# Patient Record
Sex: FEMALE | Race: BLACK OR AFRICAN AMERICAN | Employment: OTHER | URBAN - METROPOLITAN AREA
[De-identification: names, ages, dates, MRNs, and addresses within clinical notes are randomized per-mention and may not be internally consistent; named-entity substitution may affect disease eponyms.]

---

## 2017-04-03 ENCOUNTER — HOSPITAL ENCOUNTER (OUTPATIENT)
Dept: CT IMAGING | Age: 67
Discharge: HOME | End: 2017-04-03
Attending: INTERNAL MEDICINE
Payer: MEDICARE

## 2017-04-03 ENCOUNTER — HOSPITAL ENCOUNTER (OUTPATIENT)
Dept: MAMMOGRAPHY | Age: 67
Discharge: HOME | End: 2017-04-03
Attending: INTERNAL MEDICINE
Payer: MEDICARE

## 2017-04-03 DIAGNOSIS — Z78.0 POSTMENOPAUSAL: ICD-10-CM

## 2017-04-03 DIAGNOSIS — R91.1 SOLITARY PULMONARY NODULE ON LUNG CT: ICD-10-CM

## 2017-04-03 PROCEDURE — 77080 DXA BONE DENSITY AXIAL: CPT

## 2017-04-03 PROCEDURE — 71250 CT THORAX DX C-: CPT

## 2017-04-04 NOTE — PROGRESS NOTES
CT Chest report reviewed and showed one small lung nodule that is likely benign. Will repeat scan in 1 year to make sure it does not grow.   Please inform the patient

## 2017-04-06 NOTE — PROGRESS NOTES
DEXA scan bone density xray report reviewed and shows osteopenia which is mild weakness of bones but FRAX score is not bad enough to warrant any prescription medication.    Please inform the patient

## 2017-11-30 ENCOUNTER — APPOINTMENT (OUTPATIENT)
Dept: GENERAL RADIOLOGY | Age: 67
End: 2017-11-30
Attending: EMERGENCY MEDICINE
Payer: MEDICARE

## 2017-11-30 LAB
ALBUMIN SERPL-MCNC: 3.9 G/DL (ref 3.2–4.6)
ALBUMIN/GLOB SERPL: 1.1 {RATIO} (ref 1.2–3.5)
ALP SERPL-CCNC: 131 U/L (ref 50–136)
ALT SERPL-CCNC: 18 U/L (ref 12–65)
ANION GAP SERPL CALC-SCNC: 10 MMOL/L (ref 7–16)
AST SERPL-CCNC: 17 U/L (ref 15–37)
BASOPHILS # BLD: 0 K/UL (ref 0–0.2)
BASOPHILS NFR BLD: 0 % (ref 0–2)
BILIRUB SERPL-MCNC: 1.1 MG/DL (ref 0.2–1.1)
BUN SERPL-MCNC: 21 MG/DL (ref 8–23)
CALCIUM SERPL-MCNC: 9.8 MG/DL (ref 8.3–10.4)
CHLORIDE SERPL-SCNC: 105 MMOL/L (ref 98–107)
CO2 SERPL-SCNC: 29 MMOL/L (ref 21–32)
CREAT SERPL-MCNC: 1.04 MG/DL (ref 0.6–1)
DIFFERENTIAL METHOD BLD: ABNORMAL
EOSINOPHIL # BLD: 0.2 K/UL (ref 0–0.8)
EOSINOPHIL NFR BLD: 2 % (ref 0.5–7.8)
ERYTHROCYTE [DISTWIDTH] IN BLOOD BY AUTOMATED COUNT: 13.8 % (ref 11.9–14.6)
GLOBULIN SER CALC-MCNC: 3.5 G/DL (ref 2.3–3.5)
GLUCOSE SERPL-MCNC: 106 MG/DL (ref 65–100)
HCT VFR BLD AUTO: 41.6 % (ref 35.8–46.3)
HGB BLD-MCNC: 14.5 G/DL (ref 11.7–15.4)
IMM GRANULOCYTES # BLD: 0 K/UL (ref 0–0.5)
IMM GRANULOCYTES NFR BLD AUTO: 0 % (ref 0–5)
LYMPHOCYTES # BLD: 2.2 K/UL (ref 0.5–4.6)
LYMPHOCYTES NFR BLD: 23 % (ref 13–44)
MCH RBC QN AUTO: 30.7 PG (ref 26.1–32.9)
MCHC RBC AUTO-ENTMCNC: 34.9 G/DL (ref 31.4–35)
MCV RBC AUTO: 88.1 FL (ref 79.6–97.8)
MONOCYTES # BLD: 0.5 K/UL (ref 0.1–1.3)
MONOCYTES NFR BLD: 5 % (ref 4–12)
NEUTS SEG # BLD: 6.8 K/UL (ref 1.7–8.2)
NEUTS SEG NFR BLD: 70 % (ref 43–78)
PLATELET # BLD AUTO: 235 K/UL (ref 150–450)
PMV BLD AUTO: 10 FL (ref 10.8–14.1)
POTASSIUM SERPL-SCNC: 3.5 MMOL/L (ref 3.5–5.1)
PROT SERPL-MCNC: 7.4 G/DL (ref 6.3–8.2)
RBC # BLD AUTO: 4.72 M/UL (ref 4.05–5.25)
SODIUM SERPL-SCNC: 144 MMOL/L (ref 136–145)
TROPONIN I SERPL-MCNC: <0.02 NG/ML (ref 0.02–0.05)
WBC # BLD AUTO: 9.7 K/UL (ref 4.3–11.1)

## 2017-11-30 PROCEDURE — 85025 COMPLETE CBC W/AUTO DIFF WBC: CPT | Performed by: EMERGENCY MEDICINE

## 2017-11-30 PROCEDURE — 80053 COMPREHEN METABOLIC PANEL: CPT | Performed by: EMERGENCY MEDICINE

## 2017-11-30 PROCEDURE — 72040 X-RAY EXAM NECK SPINE 2-3 VW: CPT

## 2017-11-30 PROCEDURE — 96374 THER/PROPH/DIAG INJ IV PUSH: CPT | Performed by: EMERGENCY MEDICINE

## 2017-11-30 PROCEDURE — 84484 ASSAY OF TROPONIN QUANT: CPT | Performed by: EMERGENCY MEDICINE

## 2017-11-30 PROCEDURE — 99285 EMERGENCY DEPT VISIT HI MDM: CPT | Performed by: EMERGENCY MEDICINE

## 2017-11-30 PROCEDURE — 93005 ELECTROCARDIOGRAM TRACING: CPT | Performed by: EMERGENCY MEDICINE

## 2017-11-30 PROCEDURE — 96361 HYDRATE IV INFUSION ADD-ON: CPT | Performed by: EMERGENCY MEDICINE

## 2017-11-30 PROCEDURE — 96375 TX/PRO/DX INJ NEW DRUG ADDON: CPT | Performed by: EMERGENCY MEDICINE

## 2017-12-01 ENCOUNTER — HOSPITAL ENCOUNTER (EMERGENCY)
Age: 67
Discharge: HOME | End: 2017-12-01
Attending: EMERGENCY MEDICINE
Payer: MEDICARE

## 2017-12-01 VITALS
HEART RATE: 62 BPM | HEIGHT: 65 IN | RESPIRATION RATE: 18 BRPM | BODY MASS INDEX: 21.49 KG/M2 | TEMPERATURE: 98.1 F | WEIGHT: 129 LBS | DIASTOLIC BLOOD PRESSURE: 78 MMHG | OXYGEN SATURATION: 99 % | SYSTOLIC BLOOD PRESSURE: 162 MMHG

## 2017-12-01 DIAGNOSIS — R55 VASOVAGAL SYNCOPE: Primary | ICD-10-CM

## 2017-12-01 DIAGNOSIS — R11.2 NON-INTRACTABLE VOMITING WITH NAUSEA, UNSPECIFIED VOMITING TYPE: ICD-10-CM

## 2017-12-01 DIAGNOSIS — S16.1XXA STRAIN OF NECK MUSCLE, INITIAL ENCOUNTER: ICD-10-CM

## 2017-12-01 LAB
ATRIAL RATE: 78 BPM
CALCULATED P AXIS, ECG09: 50 DEGREES
CALCULATED R AXIS, ECG10: 50 DEGREES
CALCULATED T AXIS, ECG11: 58 DEGREES
DIAGNOSIS, 93000: NORMAL
P-R INTERVAL, ECG05: 160 MS
Q-T INTERVAL, ECG07: 374 MS
QRS DURATION, ECG06: 64 MS
QTC CALCULATION (BEZET), ECG08: 426 MS
VENTRICULAR RATE, ECG03: 78 BPM

## 2017-12-01 PROCEDURE — 96375 TX/PRO/DX INJ NEW DRUG ADDON: CPT | Performed by: EMERGENCY MEDICINE

## 2017-12-01 PROCEDURE — 74011250636 HC RX REV CODE- 250/636: Performed by: EMERGENCY MEDICINE

## 2017-12-01 PROCEDURE — 96374 THER/PROPH/DIAG INJ IV PUSH: CPT | Performed by: EMERGENCY MEDICINE

## 2017-12-01 PROCEDURE — 96361 HYDRATE IV INFUSION ADD-ON: CPT | Performed by: EMERGENCY MEDICINE

## 2017-12-01 RX ORDER — KETOROLAC TROMETHAMINE 30 MG/ML
15 INJECTION, SOLUTION INTRAMUSCULAR; INTRAVENOUS
Status: COMPLETED | OUTPATIENT
Start: 2017-12-01 | End: 2017-12-01

## 2017-12-01 RX ORDER — TRAMADOL HYDROCHLORIDE 50 MG/1
50 TABLET ORAL
Qty: 20 TAB | Refills: 0 | Status: SHIPPED | OUTPATIENT
Start: 2017-12-01 | End: 2018-09-24 | Stop reason: ALTCHOICE

## 2017-12-01 RX ORDER — ONDANSETRON 2 MG/ML
4 INJECTION INTRAMUSCULAR; INTRAVENOUS
Status: COMPLETED | OUTPATIENT
Start: 2017-12-01 | End: 2017-12-01

## 2017-12-01 RX ORDER — ONDANSETRON 8 MG/1
8 TABLET, ORALLY DISINTEGRATING ORAL
Qty: 12 TAB | Refills: 0 | Status: SHIPPED | OUTPATIENT
Start: 2017-12-01 | End: 2018-09-24 | Stop reason: ALTCHOICE

## 2017-12-01 RX ADMIN — SODIUM CHLORIDE 1000 ML: 900 INJECTION, SOLUTION INTRAVENOUS at 00:47

## 2017-12-01 RX ADMIN — KETOROLAC TROMETHAMINE 15 MG: 30 INJECTION, SOLUTION INTRAMUSCULAR at 01:47

## 2017-12-01 RX ADMIN — ONDANSETRON 4 MG: 2 INJECTION INTRAMUSCULAR; INTRAVENOUS at 00:47

## 2017-12-01 NOTE — ED PROVIDER NOTES
HPI Comments: Patient presents to the ER that having a syncopal episode. States today after eating dinner she began experiencing some nausea and vomiting. Reports nausea persisted. States she was leaning over to clean up some vomitus when she passed out. States  She remembers she woke up on the ground. Does not know how long she passed out for. Doesn't dorsum neck pain. Denies any accompanying chest pain or shortness of breath. Patient is a 79 y.o. female presenting with syncope. The history is provided by the patient. Syncope    This is a new problem. The current episode started 3 to 5 hours ago. The problem has not changed since onset. Length of episode of loss of consciousness: unknown. Associated symptoms include nausea and vomiting. Pertinent negatives include no palpitations, no confusion, no fever, no abdominal pain, no congestion, no back pain and no light-headedness. Associated symptoms comments: Nausea and vomiting.         Past Medical History:   Diagnosis Date    HTN (hypertension)     Solitary pulmonary nodule on lung CT        Past Surgical History:   Procedure Laterality Date    COLONOSCOPY N/A 6/16/2016    COLONOSCOPY performed by Israel Monte MD at [facility]    HX CHOLECYSTECTOMY      HX COLONOSCOPY  2005         Family History:   Problem Relation Age of Onset    Diabetes Daughter     Diabetes Brother     Diabetes Brother     Cancer Mother      lung    Cancer Sister      lung    Cancer Brother      lung       Social History     Social History    Marital status: LEGALLY      Spouse name: N/A    Number of children: N/A    Years of education: N/A     Occupational History     Retired     Social History Main Topics    Smoking status: Former Smoker     Quit date: 1/1/1970    Smokeless tobacco: Never Used    Alcohol use No    Drug use: No    Sexual activity: Not on file     Other Topics Concern    Not on file     Social History Narrative ALLERGIES: Review of patient's allergies indicates no known allergies. Review of Systems   Constitutional: Negative for fatigue, fever and unexpected weight change. HENT: Negative for congestion and dental problem. Eyes: Negative for photophobia, redness and visual disturbance. Respiratory: Negative for chest tightness, shortness of breath and stridor. Cardiovascular: Positive for syncope. Negative for palpitations and leg swelling. Gastrointestinal: Positive for nausea and vomiting. Negative for abdominal pain. Endocrine: Negative for polydipsia, polyphagia and polyuria. Genitourinary: Negative for decreased urine volume, flank pain and frequency. Musculoskeletal: Negative for back pain and gait problem. Skin: Negative for pallor and rash. Allergic/Immunologic: Negative for food allergies and immunocompromised state. Neurological: Negative for light-headedness and numbness. Hematological: Negative for adenopathy. Does not bruise/bleed easily. Psychiatric/Behavioral: Negative for behavioral problems and confusion. All other systems reviewed and are negative. Vitals:    11/30/17 2202   BP: 156/75   Pulse: 86   Resp: 18   Temp: 98.1 °F (36.7 °C)   SpO2: 97%   Weight: 58.5 kg (129 lb)   Height: 5' 5\" (1.651 m)            Physical Exam   Constitutional: She is oriented to person, place, and time. She appears well-developed and well-nourished. HENT:   Head: Normocephalic and atraumatic. Mouth/Throat: Oropharynx is clear and moist.   Eyes: Conjunctivae and EOM are normal. Pupils are equal, round, and reactive to light. Neck: Normal range of motion. Neck supple. Muscular tenderness present. No spinous process tenderness present. No rigidity. No tracheal deviation and normal range of motion present. No thyromegaly present. Cardiovascular: Normal rate, regular rhythm and intact distal pulses.     Pulmonary/Chest: Effort normal and breath sounds normal. No respiratory distress. She has no wheezes. Abdominal: Soft. Bowel sounds are normal. She exhibits no distension. There is no tenderness. Musculoskeletal: Normal range of motion. She exhibits no edema, tenderness or deformity. Neurological: She is alert and oriented to person, place, and time. She has normal reflexes. Nursing note and vitals reviewed. MDM  Number of Diagnoses or Management Options  Diagnosis management comments: Given proceeding nausea and vomiting, syncopal episode was likely vasovagal in nature  We'll obtain basic labs, EKG as well as cardiac enzymes.   Continue to monitor symptoms    12:42 AM  X-rays cervical spine read as negative       Amount and/or Complexity of Data Reviewed  Clinical lab tests: ordered and reviewed  Tests in the radiology section of CPT®: ordered and reviewed    Risk of Complications, Morbidity, and/or Mortality  Presenting problems: moderate  Diagnostic procedures: moderate  Management options: moderate    Patient Progress  Patient progress: stable    ED Course       Procedures

## 2017-12-01 NOTE — ED TRIAGE NOTES
Pt arrives via EMS with daughter. Pt states she vomited following evening meal. She went to laundry with clothes where she states she \"blacked out\". States she had another episode of vomiting after she \"woke up\". Pt denies nausea at this time. Complains of pain to neck. EMS reports confined space at location of fall.

## 2017-12-01 NOTE — DISCHARGE INSTRUCTIONS
Neck Strain: Care Instructions  Your Care Instructions    You have strained the muscles and ligaments in your neck. A sudden, awkward movement can strain the neck. This often occurs with falls or car accidents or during certain sports. Everyday activities like working on a computer or sleeping can also cause neck strain if they force you to hold your neck in an awkward position for a long time. It is common for neck pain to get worse for a day or two after an injury, but it should start to feel better after that. You may have more pain and stiffness for several days before it gets better. This is expected. It may take a few weeks or longer for it to heal completely. Good home treatment can help you get better faster and avoid future neck problems. Follow-up care is a key part of your treatment and safety. Be sure to make and go to all appointments, and call your doctor if you are having problems. It's also a good idea to know your test results and keep a list of the medicines you take. How can you care for yourself at home? · If you were given a neck brace (cervical collar) to limit neck motion, wear it as instructed for as many days as your doctor tells you to. Do not wear it longer than you were told to. Wearing a brace for too long can make neck stiffness worse and weaken the neck muscles. · You can try using heat or ice to see if it helps. ¨ Try using a heating pad on a low or medium setting for 15 to 20 minutes every 2 to 3 hours. Try a warm shower in place of one session with the heating pad. You can also buy single-use heat wraps that last up to 8 hours. ¨ You can also try an ice pack for 10 to 15 minutes every 2 to 3 hours. · Take pain medicines exactly as directed. ¨ If the doctor gave you a prescription medicine for pain, take it as prescribed. ¨ If you are not taking a prescription pain medicine, ask your doctor if you can take an over-the-counter medicine.   · Gently rub the area to relieve pain and help with blood flow. Do not massage the area if it hurts to do so. · Do not do anything that makes the pain worse. Take it easy for a couple of days. You can do your usual activities if they do not hurt your neck or put it at risk for more stress or injury. · Try sleeping on a special neck pillow. Place it under your neck, not under your head. Placing a tightly rolled-up towel under your neck while you sleep will also work. If you use a neck pillow or rolled towel, do not use your regular pillow at the same time. · To prevent future neck pain, do exercises to stretch and strengthen your neck and back. Learn how to use good posture, safe lifting techniques, and proper body mechanics. When should you call for help? Call 911 anytime you think you may need emergency care. For example, call if:  ? · You are unable to move an arm or a leg at all. ?Call your doctor now or seek immediate medical care if:  ? · You have new or worse symptoms in your arms, legs, chest, belly, or buttocks. Symptoms may include:  ¨ Numbness or tingling. ¨ Weakness. ¨ Pain. ? · You lose bladder or bowel control. ? Watch closely for changes in your health, and be sure to contact your doctor if:  ? · You are not getting better as expected. Where can you learn more? Go to http://nini-kaiden.info/. Enter M253 in the search box to learn more about \"Neck Strain: Care Instructions. \"  Current as of: March 21, 2017  Content Version: 11.4  © 5150-2456 [email or web address]. Care instructions adapted under license by [employer] (which disclaims liability or warranty for this information). If you have questions about a medical condition or this instruction, always ask your healthcare professional. [place] [address] any warranty or liability for your use of this information.          Fainting: Care Instructions  Your Care Instructions    When you faint, or pass out, you lose consciousness for a short time. A brief drop in blood flow to the brain often causes it. When you fall or lie down, more blood flows to your brain and you regain consciousness. Emotional stress, pain, or overheating-especially if you have been standing-can make you faint. In these cases, fainting is usually not serious. But fainting can be a sign of a more serious problem. Your doctor may want you to have more tests to rule out other causes. The treatment you need depends on the reason why you fainted. The doctor has checked you carefully, but problems can develop later. If you notice any problems or new symptoms, get medical treatment right away. Follow-up care is a key part of your treatment and safety. Be sure to make and go to all appointments, and call your doctor if you are having problems. It's also a good idea to know your test results and keep a list of the medicines you take. How can you care for yourself at home? · Drink plenty of fluids to prevent dehydration. If you have kidney, heart, or liver disease and have to limit fluids, talk with your doctor before you increase your fluid intake. When should you call for help? Call 911 anytime you think you may need emergency care. For example, call if:  ? · You have symptoms of a heart problem. These may include:  ¨ Chest pain or pressure. ¨ Severe trouble breathing. ¨ A fast or irregular heartbeat. ¨ Lightheadedness or sudden weakness. ¨ Coughing up pink, foamy mucus. ¨ Passing out. After you call 911, the  may tell you to chew 1 adult-strength or 2 to 4 low-dose aspirin. Wait for an ambulance. Do not try to drive yourself. ? · You have symptoms of a stroke. These may include:  ¨ Sudden numbness, tingling, weakness, or loss of movement in your face, arm, or leg, especially on only one side of your body. ¨ Sudden vision changes. ¨ Sudden trouble speaking. ¨ Sudden confusion or trouble understanding simple statements.   ¨ Sudden problems with walking or balance. ¨ A sudden, severe headache that is different from past headaches. ? · You passed out (lost consciousness) again. ? Watch closely for changes in your health, and be sure to contact your doctor if:  ? · You do not get better as expected. Where can you learn more? Go to http://nini-kaiden.info/. Enter H742 in the search box to learn more about \"Fainting: Care Instructions. \"  Current as of: March 20, 2017  Content Version: 11.4  © 8771-9680 [employer]. Care instructions adapted under license by [employer] (which disclaims liability or warranty for this information). If you have questions about a medical condition or this instruction, always ask your healthcare professional. [address] any warranty or liability for your use of this information. Nausea and Vomiting: Care Instructions  Your Care Instructions    When you are nauseated, you may feel weak and sweaty and notice a lot of saliva in your mouth. Nausea often leads to vomiting. Most of the time you do not need to worry about nausea and vomiting, but they can be signs of other illnesses. Two common causes of nausea and vomiting are stomach flu and food poisoning. Nausea and vomiting from viral stomach flu will usually start to improve within 24 hours. Nausea and vomiting from food poisoning may last from 12 to 48 hours. The doctor has checked you carefully, but problems can develop later. If you notice any problems or new symptoms, get medical treatment right away. Follow-up care is a key part of your treatment and safety. Be sure to make and go to all appointments, and call your doctor if you are having problems. It's also a good idea to know your test results and keep a list of the medicines you take. How can you care for yourself at home?   · To prevent dehydration, drink plenty of fluids, enough so that your urine is light yellow or clear like water. Choose water and other caffeine-free clear liquids until you feel better. If you have kidney, heart, or liver disease and have to limit fluids, talk with your doctor before you increase the amount of fluids you drink. · Rest in bed until you feel better. · When you are able to eat, try clear soups, mild foods, and liquids until all symptoms are gone for 12 to 48 hours. Other good choices include dry toast, crackers, cooked cereal, and gelatin dessert, such as Jell-O. When should you call for help? Call 911 anytime you think you may need emergency care. For example, call if:  ? · You passed out (lost consciousness). ?Call your doctor now or seek immediate medical care if:  ? · You have symptoms of dehydration, such as:  ¨ Dry eyes and a dry mouth. ¨ Passing only a little dark urine. ¨ Feeling thirstier than usual.   ? · You have new or worsening belly pain. ? · You have a new or higher fever. ? · You vomit blood or what looks like coffee grounds. ? Watch closely for changes in your health, and be sure to contact your doctor if:  ? · You have ongoing nausea and vomiting. ? · Your vomiting is getting worse. ? · Your vomiting lasts longer than 2 days. ? · You are not getting better as expected. Where can you learn more? Go to http://nini-kaiden.info/. Enter 25 [id number] in the search box to learn more about \"Nausea and Vomiting: Care Instructions. \"  Current as of: March 20, 2017  Content Version: 11.4  © 7251-6480 [employer]. Care instructions adapted under license by [employer] (which disclaims liability or warranty for this information). If you have questions about a medical condition or this instruction, always ask your healthcare professional. [place] [address] any warranty or liability for your use of this information.          Vasovagal Syncope: Care Instructions  Your Care Instructions    Vasovagal syncope (say \"ilq-msf-EMY-saima DONOVANHNHU-kle-jde\")is sudden dizziness or fainting that can be set off by things such as pain, stress, fear, or trauma. You may sweat or feel lightheaded, sick to your stomach, or tingly. The problem causes the heart rate to slow and the blood vessels to widen, or dilate, for a short time. When this happens, blood pools in the lower body, and less blood goes to the brain. You can usually get relief by lying down with your legs raised (elevated). This helps more blood to flow to your brain and may help relieve symptoms like feeling dizzy. Some doctors may recommend a technique that involves tensing your fists and arms. This type of fainting is often easy to predict. For example, it happens to some people when they see blood or have to get a shot. They may feel symptoms before they faint. An episode of vasovagal syncope usually responds well to self-care. Other treatment often isn't needed. But if the fainting keeps happening, your doctor may suggest further treatments. Follow-up care is a key part of your treatment and safety. Be sure to make and go to all appointments, and call your doctor if you are having problems. It's also a good idea to know your test results and keep a list of the medicines you take. How can you care for yourself at home? · Drink plenty of fluids to prevent dehydration. If you have kidney, heart, or liver disease and have to limit fluids, talk with your doctor before you increase your fluid intake. · Try to avoid things that you think may set off vasovagal syncope. · Talk to your doctor about any medicines you take. Some medicines may increase the chance of this condition occurring. · If you feel symptoms, lie down with your legs raised. Talk to your doctor about what to do if your symptoms come back. When should you call for help? Call 911 anytime you think you may need emergency care. For example, call if:  ? · You have symptoms of a heart problem.  These may include:  ¨ Chest pain or pressure. ¨ Severe trouble breathing. ¨ A fast or irregular heartbeat. ? Watch closely for changes in your health, and be sure to contact your doctor if:  ? · You have more episodes of fainting at home. ? · You do not get better as expected. Where can you learn more? Go to http://nini-kaiden.info/. Enter L754 in the search box to learn more about \"Vasovagal Syncope: Care Instructions. \"  Current as of: March 20, 2017  Content Version: 11.4  © 1413-8674 [employer]. Care instructions adapted under license by [email or web address] (which disclaims liability or warranty for this information). If you have questions about a medical condition or this instruction, always ask your healthcare professional. [address] any warranty or liability for your use of this information.

## 2018-02-26 ENCOUNTER — HOSPITAL ENCOUNTER (OUTPATIENT)
Dept: LAB | Age: 68
Discharge: HOME | End: 2018-02-26

## 2018-02-26 PROCEDURE — 88305 TISSUE EXAM BY PATHOLOGIST: CPT | Performed by: INTERNAL MEDICINE

## 2018-04-23 ENCOUNTER — HOSPITAL ENCOUNTER (OUTPATIENT)
Dept: CT IMAGING | Age: 68
Discharge: HOME | End: 2018-04-23
Attending: INTERNAL MEDICINE
Payer: MEDICARE

## 2018-04-23 DIAGNOSIS — R91.1 SOLITARY PULMONARY NODULE ON LUNG CT: ICD-10-CM

## 2018-04-23 PROCEDURE — 71250 CT THORAX DX C-: CPT

## 2018-04-24 NOTE — PROGRESS NOTES
Ct Chest report reviewed and shows no change in lung nodules supporting a benign cause.   Please inform the patient

## 2019-04-08 ENCOUNTER — HOSPITAL ENCOUNTER (OUTPATIENT)
Dept: CT IMAGING | Age: 69
Discharge: HOME | End: 2019-04-08
Attending: INTERNAL MEDICINE
Payer: MEDICARE

## 2019-04-08 DIAGNOSIS — R91.1 SOLITARY PULMONARY NODULE ON LUNG CT: ICD-10-CM

## 2019-04-08 PROCEDURE — 71250 CT THORAX DX C-: CPT

## 2019-04-09 NOTE — PROGRESS NOTES
CT Chest report reviewed and the radiologist felt one nodule increased by 1 mm in the past year which is minimal so will repeat again in 1 year.

## 2019-07-18 PROBLEM — E78.49 OTHER HYPERLIPIDEMIA: Status: ACTIVE | Noted: 2019-07-18

## 2019-07-18 PROBLEM — G45.9 TIA (TRANSIENT ISCHEMIC ATTACK): Status: ACTIVE | Noted: 2019-07-18

## 2020-10-19 ENCOUNTER — HOSPITAL ENCOUNTER (OUTPATIENT)
Dept: CT IMAGING | Age: 70
Discharge: HOME | End: 2020-10-19
Attending: INTERNAL MEDICINE
Payer: MEDICARE

## 2020-10-19 DIAGNOSIS — R91.1 SOLITARY PULMONARY NODULE ON LUNG CT: ICD-10-CM

## 2020-10-19 PROCEDURE — 71250 CT THORAX DX C-: CPT

## 2020-11-16 ENCOUNTER — HOSPITAL ENCOUNTER (OUTPATIENT)
Dept: MAMMOGRAPHY | Age: 70
Discharge: HOME | End: 2020-11-16
Attending: INTERNAL MEDICINE
Payer: MEDICARE

## 2020-11-16 DIAGNOSIS — Z78.0 POSTMENOPAUSAL: ICD-10-CM

## 2020-11-16 DIAGNOSIS — M85.80 OSTEOPENIA, UNSPECIFIED LOCATION: ICD-10-CM

## 2020-11-16 PROCEDURE — 77080 DXA BONE DENSITY AXIAL: CPT

## 2020-11-16 NOTE — PROGRESS NOTES
DEXA bone density report reviewed and improved in some areas and worsened in others, but is not bad enough to warrant prescription treatment

## 2021-03-22 ENCOUNTER — HOSPITAL ENCOUNTER (OUTPATIENT)
Dept: LAB | Age: 71
Discharge: HOME | End: 2021-03-22

## 2021-03-22 PROCEDURE — 88305 TISSUE EXAM BY PATHOLOGIST: CPT

## 2021-08-25 ENCOUNTER — HOSPITAL ENCOUNTER (EMERGENCY)
Age: 71
Discharge: HOME | End: 2021-08-25
Attending: STUDENT IN AN ORGANIZED HEALTH CARE EDUCATION/TRAINING PROGRAM
Payer: MEDICARE

## 2021-08-25 VITALS
TEMPERATURE: 99.5 F | BODY MASS INDEX: 21.32 KG/M2 | WEIGHT: 125 LBS | RESPIRATION RATE: 20 BRPM | DIASTOLIC BLOOD PRESSURE: 72 MMHG | OXYGEN SATURATION: 98 % | SYSTOLIC BLOOD PRESSURE: 146 MMHG | HEART RATE: 95 BPM

## 2021-08-25 DIAGNOSIS — Z20.822 SUSPECTED COVID-19 VIRUS INFECTION: Primary | ICD-10-CM

## 2021-08-25 PROCEDURE — 99282 EMERGENCY DEPT VISIT SF MDM: CPT

## 2021-08-25 PROCEDURE — U0005 INFEC AGEN DETEC AMPLI PROBE: HCPCS

## 2021-08-25 NOTE — DISCHARGE INSTRUCTIONS
Drink plenty of fluids, rest, use over-the-counter ibuprofen and/or Tylenol as directed if needed for fever or body aches. Check your pulse oximeter multiple times a day to make sure that your oxygen level is staying above 90%. Return to the ED if worsening in anyway.

## 2021-08-25 NOTE — ED PROVIDER NOTES
Patient is here with nasal congestion, dry cough, body aches, chills, fever and not feeling well for 3 days now. She did get the COVID vaccine in February. No chest pain or shortness of breath, abdominal pain, dizziness, dyspnea on exertion, orthopnea, neck pain/stiffness, rash, swelling of her arms or legs, trouble with urination or bowel movements or other symptoms. She was ambulatory to the room without difficulty, and well-hydrated. The history is provided by the patient. Flu  This is a new problem. The current episode started more than 2 days ago. The problem occurs every few minutes. The problem has been gradually worsening. The cough is non-productive. Patient reports a subjective fever - was not measured. Associated symptoms include chills, rhinorrhea and myalgias. Pertinent negatives include no chest pain, no sweats, no weight loss, no eye redness, no ear congestion, no ear pain, no headaches, no sore throat, no shortness of breath, no wheezing, no nausea, no vomiting and no confusion. She has tried nothing for the symptoms. She is not a smoker.         Past Medical History:   Diagnosis Date    Diverticulosis     HTN (hypertension)     Solitary pulmonary nodule on lung CT     Stroke [facility])        Past Surgical History:   Procedure Laterality Date    COLONOSCOPY N/A 6/16/2016    COLONOSCOPY performed by Sh[facility] Lindo [facility] at [facility]OSCOPY    HX CHOLECYSTECTOMY      HX COLONOSCOPY  2018         Family History:   Problem Relation Age of Onset    Diabetes Daughter     Diabetes Brother     Diabetes Brother     Cancer Brother         lung    Cancer Mother         lung    Cancer Sister         lung    Cancer Brother         throat    Other Sister         mentally ill    Diabetes Sister     No Known Problems Brother        Social History     Socioeconomic History    Marital status: LEGALLY      Spouse name: Not on file    Number of children: Not on file    Years of education: Not on file    Highest education level: Not on file   Occupational History    Occupation:      Employer: RETIRED   Tobacco Use    Smoking status: Former Smoker     Packs/day: 0.50     Years: 15.00     Pack years: 7.50     Quit date: 1970     Years since quittin.6    Smokeless tobacco: Never Used   Vaping Use    Vaping Use: Never used   Substance and Sexual Activity    Alcohol use: No    Drug use: No    Sexual activity: Not on file   Other Topics Concern    Not on file   Social History Narrative    Not on file     Social Determinants of Health     Financial Resource Strain:     Difficulty of Paying Living Expenses:    Food Insecurity:     Worried About Running Out of Food in the Last Year:     [address] in the Last Year:    Transportation Needs:     Lack of Transportation (Medical):  Lack of Transportation (Non-Medical):    Physical Activity:     Days of Exercise per Week:     Minutes of Exercise per Session:    Stress:     Feeling of Stress :    Social Connections:     Frequency of Communication with Friends and Family:     Frequency of Social Gatherings with Friends and Family:     Attends [employer] Services:     Active Member of Clubs or Organizations:     Attends Club or Organization Meetings:     Marital Status:    Intimate Partner Violence:     Fear of Current or Ex-Partner:     Emotionally Abused:     Physically Abused:     Sexually Abused: ALLERGIES: Patient has no known allergies. Review of Systems   Constitutional: Positive for chills. Negative for weight loss. HENT: Positive for rhinorrhea. Negative for ear pain and sore throat. Eyes: Negative. Negative for redness. Respiratory: Negative. Negative for shortness of breath and wheezing. Cardiovascular: Negative. Negative for chest pain. Gastrointestinal: Negative. Negative for nausea and vomiting. Genitourinary: Negative. Musculoskeletal: Positive for myalgias. Skin: Negative. Neurological: Negative. Negative for headaches. Psychiatric/Behavioral: Negative. Negative for confusion. All other systems reviewed and are negative. Vitals:    08/25/21 1433   BP: (!) 146/72   Pulse: 95   Resp: 20   Temp: 99.5 °F (37.5 °C)   SpO2: 98%   Weight: 56.7 kg (125 lb)            Physical Exam  Vitals and nursing note reviewed. Exam conducted with a chaperone present. Constitutional:       Appearance: She is well-developed. HENT:      Head: Normocephalic and atraumatic. Right Ear: Tympanic membrane, ear canal and external ear normal.      Left Ear: Tympanic membrane, ear canal and external ear normal.      Nose: Nose normal.      Mouth/Throat:      Mouth: Mucous membranes are moist.      Pharynx: No posterior oropharyngeal erythema. Eyes:      Conjunctiva/sclera: Conjunctivae normal.      Pupils: Pupils are equal, round, and reactive to light. Cardiovascular:      Rate and Rhythm: Normal rate and regular rhythm. Pulses: Normal pulses. Heart sounds: Normal heart sounds. Pulmonary:      Effort: Pulmonary effort is normal.      Breath sounds: Normal breath sounds. No wheezing or rhonchi. Abdominal:      General: Bowel sounds are normal.      Palpations: Abdomen is soft. Musculoskeletal:         General: Normal range of motion. Cervical back: Normal range of motion and neck supple. Skin:     General: Skin is warm and dry. Capillary Refill: Capillary refill takes less than 2 seconds. Neurological:      General: No focal deficit present. Mental Status: She is alert and oriented to person, place, and time. Mental status is at baseline. Deep Tendon Reflexes: Reflexes are normal and symmetric. Psychiatric:         Mood and Affect: Mood normal.         Behavior: Behavior normal.         Thought Content:  Thought content normal.         Judgment: Judgment normal.          MDM  Number of Diagnoses or Management Options     Amount and/or Complexity of Data Reviewed  Clinical lab tests: ordered    Risk of Complications, Morbidity, and/or Mortality  Presenting problems: moderate  Diagnostic procedures: moderate  Management options: moderate           Procedures      The patient was observed in the ED. Coronovirus test ordered. O2 sat with ambulation was 98%. Patient is vaccinated. Patient was instructed to self quarantine until the test results are back. If negative she can return to work, if positive she will need to quarantine for 10 days from the onset of symptoms. Patient was encouraged to drink plenty of fluids, rest, use Tylenol if needed as directed for fever or myalgias. She was stable for discharge and ambulatory out of the ER without difficulty at this time. She should return to the ED if worse in anyway. She expressed understanding. I discussed the results of all labs, procedures, radiographs, and treatments with the patient and available family. Treatment plan is agreed upon and the patient is ready for discharge. All voiced understanding of the discharge plan and medication instructions or changes as appropriate. Questions about treatment in the ED were answered. All were encouraged to return should symptoms worsen or new problems develop.

## 2021-08-25 NOTE — ED TRIAGE NOTES
Patient presents ambulatory via the lobby with concern for covid. Patient states she awoke Sunday with complaints of sore throat, weakness. Patient presents speaking in complete sentences. o2 sat 98%. Complaints today of shortness of breath, fatigue. Denies fever. Positive cough. Positive vaccination. Patient with loss of taste/smell.

## 2021-08-25 NOTE — Clinical Note
[address] [facility] EMERGENCY DEPT   [facility]  [place] [address]  [phone]    Work/School Note    Date: 8/25/2021     To Whom It May concern:    Hetal Bhardwaj was evaulated by the following provider(s):  Attending Provider: Campbell Phan DO  Physician Assistant: Cecy Friedman, [address] virus is suspected. Per the CDC guidelines we recommend home isolation until the following conditions are all met:    1. At least 10 days have passed since symptoms first appeared and  2. At least 24 hours have passed since last fever without the use of fever-reducing medications and  3.  Symptoms (e.g., cough, shortness of breath) have improved    Sincerely,          Darryl Lombard, PA

## 2021-08-25 NOTE — ED NOTES
I have reviewed discharge instructions with the patient. The patient verbalized understanding. Patient left ED via Discharge Method: ambulatory to Home with self    Opportunity for questions and clarification provided. Patient given 0 scripts. To continue your aftercare when you leave the hospital, you may receive an automated call from our care team to check in on how you are doing. This is a free service and part of our promise to provide the best care and service to meet your aftercare needs.  If you have questions, or wish to unsubscribe from this service please call [phone]. Thank you for Choosing our [facility] Emergency Department.

## 2021-08-26 LAB
SARS-COV-2, COV2: DETECTED
SPECIMEN SOURCE, FCOV2M: ABNORMAL

## 2021-08-27 NOTE — PROGRESS NOTES
Patient was called with +COVID-19 test results. She provided her name and date of birth for identification. She is maintaining oxygen saturations around 93-94%. She is still feeling okay. No cough. She was instructed to return if worsening and to stay home and quarantine until September 1. All of her questions were answered. The patient was called for notification of a POSITIVE test result for COVID-19. The following information was given to the patient:    The COVID-19 test result was positive  Mild and stable symptoms are managed at home    Treatment of coronavirus does not require an antibiotic   For most persons with COVID-19 illness, isolation and precautions can generally be discontinued 10 days after symptom onset and resolution of fever for at least 24 hours, without the use of fever-reducing medications, and with improvement of other symptoms. A limited number of persons with severe illness or severe immunosuppression may produce replication-competent virus beyond 10 days that may warrant extending duration of isolation and precautions for up to 20 days after symptom onset. For persons who never develop symptoms, isolation and other precautions can be discontinued 10 days after the date of their first positive RT-PCR test for SARS-CoV-2 RNA. Patient was instructed to remain isolated until 09/01/21. Wash hands often with soap and water for at least 20 seconds or alternatively use hand  with at least 60% alcohol content  Cover coughs and sneezes  Wear a mask when around others if possible  Clean all \"high-touch\" surfaces every day, such as doorknobs and cellphones  Continually monitor symptoms.  Contact your medical provider if symptoms are worsening, such as difficulty breathing  For more information visit the CDC website: DotProtection.gl

## 2022-03-19 PROBLEM — E78.49 OTHER HYPERLIPIDEMIA: Status: ACTIVE | Noted: 2019-07-18

## 2022-03-20 PROBLEM — G45.9 TIA (TRANSIENT ISCHEMIC ATTACK): Status: ACTIVE | Noted: 2019-07-18

## 2022-06-01 NOTE — PROGRESS NOTES
06/01 Resumption orders sent to [facility] for enterals. Pt is vendor only.           Magdy Gonsales, RN, BSN, CPN  Tie:  Pt notified of Dexa results

## 2024-04-22 ENCOUNTER — TRANSCRIBE ORDERS (OUTPATIENT)
Dept: SCHEDULING | Age: 74
End: 2024-04-22

## 2024-04-22 DIAGNOSIS — Z12.31 SCREENING MAMMOGRAM FOR HIGH-RISK PATIENT: Primary | ICD-10-CM
